# Patient Record
Sex: FEMALE | Race: WHITE | NOT HISPANIC OR LATINO | Employment: OTHER | URBAN - METROPOLITAN AREA
[De-identification: names, ages, dates, MRNs, and addresses within clinical notes are randomized per-mention and may not be internally consistent; named-entity substitution may affect disease eponyms.]

---

## 2022-02-15 ENCOUNTER — TRANSFERRED RECORDS (OUTPATIENT)
Dept: HEALTH INFORMATION MANAGEMENT | Facility: CLINIC | Age: 67
End: 2022-02-15
Payer: COMMERCIAL

## 2022-03-15 ENCOUNTER — OFFICE VISIT (OUTPATIENT)
Dept: VASCULAR SURGERY | Facility: CLINIC | Age: 67
End: 2022-03-15
Payer: COMMERCIAL

## 2022-03-15 DIAGNOSIS — I83.813 VARICOSE VEINS OF BILATERAL LOWER EXTREMITIES WITH PAIN: Primary | ICD-10-CM

## 2022-03-15 PROCEDURE — 99203 OFFICE O/P NEW LOW 30 MIN: CPT | Performed by: SURGERY

## 2022-03-15 RX ORDER — LACTOBACILLUS RHAMNOSUS GG 10B CELL
1 CAPSULE ORAL DAILY
COMMUNITY

## 2022-03-15 RX ORDER — ASCORBIC ACID 100 MG
TABLET,CHEWABLE ORAL
COMMUNITY

## 2022-03-15 RX ORDER — MULTIVIT WITH MINERALS/LUTEIN
1 TABLET ORAL DAILY
COMMUNITY

## 2022-03-15 NOTE — PROGRESS NOTES
Patient Reported symptoms:    Right leg   Heaviness None of the time   Achiness Some of the time   Swelling Some of the time   Throbbing None of the time   Itching Some of the time   Appearance Very noticeable   Impact on work/activities Mildly reduced     Left Leg   Heaviness None of the time   Achiness None of the time   Swelling Some of the time   Throbbing None of the time   Itching Some of the time   Appearance Very noticeable   Impact on work/activities Symptoms but full able to participate

## 2022-03-15 NOTE — LETTER
3/15/2022         RE: Nivia Christian  [address]  [facility] [address]        Dear Colleague,    Thank you for referring your patient, Nivia Christian, to the [facility] [place]. Please see a copy of my visit note below.        Patient Reported symptoms:    Right leg   Heaviness None of the time   Achiness Some of the time   Swelling Some of the time   Throbbing None of the time   Itching Some of the time   Appearance Very noticeable   Impact on work/activities Mildly reduced     Left Leg   Heaviness None of the time   Achiness None of the time   Swelling Some of the time   Throbbing None of the time   Itching Some of the time   Appearance Very noticeable   Impact on work/activities Symptoms but full able to participate    [facility] CONSULTATION    HPI:    Nivia Christian is a pleasant 67 year old self-referred female who presents with complaints of recurrent right lower extremity superficial thrombophlebitis.  The first episode of superficial thrombophlebitis occurred about 16 months ago.  The patient states that she was sitting in a recliner, got up to walk and noted significant discomfort in her right medial calf.  About 2 to 3 days prior to that she had been hiking with an ultrasound at that time showing superficial thrombophlebitis on the right medial calf.  On January 30, 2022, she noted some firmness of the right medial calf for which she contacted her primary care physician who felt that this was consistent with superficial thrombophlebitis.  There was no erythema, significant tenderness or swelling, therefore anti-inflammatories and antiplatelets were recommended.  On 2/15/2022 she developed firmness of the distal medial right thigh prompting a right lower extremity venous ultrasound which confirmed no evidence of deep vein thrombosis but extensive superficial phlebitis in the midportion of the right great saphenous vein.  The symptoms have slowly resolved.  Again, she has  "some mild firmness but this has nearly completely resolved.    She is currently asymptomatic.  She has no increased swelling of her right lower extremity and has had no pleuritic chest pain, shortness of breath or leg swelling.  She is somewhat concerned about recurrent superficial phlebitis and is seeking information.    He has no history of deep vein thrombosis or hemorrhage.  She has tried compression hose but does not seem to be able to tolerate them.  They were too tight on her calves if she wore knee-high compression and tended to roll down her thighs if she were thigh-high compression.    PAST MEDICAL HISTORY: Right distal medial leg \"stripping\" 1980s    PAST SURGICAL HISTORY:   Past Surgical History:   Procedure Laterality Date     ORTHOPEDIC SURGERY      Spinal fusion for scoliosis at age 19       FAMILY HISTORY: Varicose veins in her father who underwent vein stripping    SOCIAL HISTORY:   Social History     Tobacco Use     Smoking status: Never Smoker     Smokeless tobacco: Not on file   Substance Use Topics     Alcohol use: No       REVIEW OF SYSTEMS: Review Of Systems  Skin: negative  Eyes: negative  Ears/Nose/Throat: negative  Respiratory: No shortness of breath, dyspnea on exertion, cough, or hemoptysis  Cardiovascular: negative  Gastrointestinal: negative  Genitourinary: negative  Musculoskeletal: Back pain, neck pain, foot pain, leg swelling  Neurologic: negative  Psychiatric: negative  Hematologic/Lymphatic/Immunologic: negative  Endocrine: negative      Vital signs:  There were no vitals taken for this visit.    Current Outpatient Medications   Medication Sig Dispense Refill     Ascorbic Acid (VITAMIN C) 100 MG CHEW        lactobacillus rhamnosus, GG, (CULTURELL) capsule Take 1 capsule by mouth daily       multivitamin (CENTRUM SILVER) tablet Take 1 tablet by mouth daily       Omeprazole (PRILOSEC PO) Take  by mouth.       Vitamin D3 (CHOLECALCIFEROL) 125 MCG (5000 UT) tablet Take by mouth daily   "     docusate sodium 100 MG CAPS Take 100 mg by mouth 2 times daily (Patient not taking: Reported on 3/15/2022) 60 capsule 0     oxyCODONE-acetaminophen (PERCOCET) 5-325 MG per tablet Take 1-2 tablets by mouth every 6 hours as needed for pain (Patient not taking: Reported on 3/15/2022) 45 tablet 0     SERTRALINE HCL PO Take 50 mg by mouth daily. (Patient not taking: Reported on 3/15/2022)         PHYSICAL EXAM:  General: Pleasant, NAD.   HEENT: Normocephalic, atraumatic, external ears and nose normal.   Respiratory: Normal respiratory effort.   Cardiovascular: Pulse is regular.   Musculoskeletal: Gait and station normal.  The joints of her fingers and toes without deformity.  There is no cyanosis of her nailbeds.   EXTREMITIES: Right lower extremity: Few scattered telangiectasias.  No significant varicose veins.  Small area of induration proximal medial right calf and area of previous superficial thrombophlebitis.  No erythema or significant swelling.  No stasis changes.    Left lower extremity: 5 to 7 mm varicosities over the distal medial left thigh and on the medial left calf.  Trace edema.  No stasis hyperpigmentation..    PULSES: R/L (3=normal pulse, 0=no palpable pulse) dorsalis pedis: 2/2; posterior tibial: 2/2.      Neurologic: Grossly normal  Psychiatric: Mood, affect, judgment and insight are normal     Venous Duplex Ultrasound: 2/15/2022 [facility]  No evidence of deep vein thrombosis.  Extensive superficial phlebitis of the midportion of the right great saphenous vein    ASSESSMENT:  Recurrent superficial thrombophlebitis right lower extremity.  significant tenderness.  This seems to be resolving nicely and the patient is asymptomatic at this time.  We discussed the lower extremity vein anatomy, the pathophysiology of venous insufficiency and the 30 to 50% chance of recurrent superficial thrombophlebitis.  The patient states that she had varicosities visible on the right leg prior to the phlebitis and  these are no longer visible.    She has asymptomatic varicose veins of her left lower extremity without any complications.  The option of continued conservative measures with risks of superficial phlebitis, bleeding and progression of the disease process were discussed.  The patient prefers conservative measures as possible as she is currently asymptomatic.    We discussed options for treating superficial venous insufficiency in an effort to decrease risks of superficial thrombophlebitis.  Endovenous ablation for axial incompetence with risks of superficial thrombophlebitis, deep vein thrombosis, bleeding, infection, nerve injury were all discussed.  She understands this will be performed in the office setting.    PLAN:  Conservative management.  The patient's had difficulty with compression, therefore we will see if we can fit her with appropriate compression which should be worn on a regular basis, especially when less active or when traveling.  She will contact us if she has other concerns or questions     David Key MD    Dictated using Dragon voice recognition software which may result in transcription errors    [facility] LEG DRAWING:                  Again, thank you for allowing me to participate in the care of your patient.        Sincerely,        David Key MD

## 2022-03-16 NOTE — PROGRESS NOTES
"[facility] CONSULTATION    HPI:    Nivia Christian is a pleasant 67 year old self-referred female who presents with complaints of recurrent right lower extremity superficial thrombophlebitis.  The first episode of superficial thrombophlebitis occurred about 16 months ago.  The patient states that she was sitting in a recliner, got up to walk and noted significant discomfort in her right medial calf.  About 2 to 3 days prior to that she had been hiking with an ultrasound at that time showing superficial thrombophlebitis on the right medial calf.  On January 30, 2022, she noted some firmness of the right medial calf for which she contacted her primary care physician who felt that this was consistent with superficial thrombophlebitis.  There was no erythema, significant tenderness or swelling, therefore anti-inflammatories and antiplatelets were recommended.  On 2/15/2022 she developed firmness of the distal medial right thigh prompting a right lower extremity venous ultrasound which confirmed no evidence of deep vein thrombosis but extensive superficial phlebitis in the midportion of the right great saphenous vein.  The symptoms have slowly resolved.  Again, she has some mild firmness but this has nearly completely resolved.    She is currently asymptomatic.  She has no increased swelling of her right lower extremity and has had no pleuritic chest pain, shortness of breath or leg swelling.  She is somewhat concerned about recurrent superficial phlebitis and is seeking information.    He has no history of deep vein thrombosis or hemorrhage.  She has tried compression hose but does not seem to be able to tolerate them.  They were too tight on her calves if she wore knee-high compression and tended to roll down her thighs if she were thigh-high compression.    PAST MEDICAL HISTORY: Right distal medial leg \"stripping\" 1980s    PAST SURGICAL HISTORY:   Past Surgical History:   Procedure Laterality Date     ORTHOPEDIC " SURGERY      Spinal fusion for scoliosis at age 19       FAMILY HISTORY: Varicose veins in her father who underwent vein stripping    SOCIAL HISTORY:   Social History     Tobacco Use     Smoking status: Never Smoker     Smokeless tobacco: Not on file   Substance Use Topics     Alcohol use: No       REVIEW OF SYSTEMS: Review Of Systems  Skin: negative  Eyes: negative  Ears/Nose/Throat: negative  Respiratory: No shortness of breath, dyspnea on exertion, cough, or hemoptysis  Cardiovascular: negative  Gastrointestinal: negative  Genitourinary: negative  Musculoskeletal: Back pain, neck pain, foot pain, leg swelling  Neurologic: negative  Psychiatric: negative  Hematologic/Lymphatic/Immunologic: negative  Endocrine: negative      Vital signs:  There were no vitals taken for this visit.    Current Outpatient Medications   Medication Sig Dispense Refill     Ascorbic Acid (VITAMIN C) 100 MG CHEW        lactobacillus rhamnosus, GG, (CULTURELL) capsule Take 1 capsule by mouth daily       multivitamin (CENTRUM SILVER) tablet Take 1 tablet by mouth daily       Omeprazole (PRILOSEC PO) Take  by mouth.       Vitamin D3 (CHOLECALCIFEROL) 125 MCG (5000 UT) tablet Take by mouth daily       docusate sodium 100 MG CAPS Take 100 mg by mouth 2 times daily (Patient not taking: Reported on 3/15/2022) 60 capsule 0     oxyCODONE-acetaminophen (PERCOCET) 5-325 MG per tablet Take 1-2 tablets by mouth every 6 hours as needed for pain (Patient not taking: Reported on 3/15/2022) 45 tablet 0     SERTRALINE HCL PO Take 50 mg by mouth daily. (Patient not taking: Reported on 3/15/2022)         PHYSICAL EXAM:  General: Pleasant, NAD.   HEENT: Normocephalic, atraumatic, external ears and nose normal.   Respiratory: Normal respiratory effort.   Cardiovascular: Pulse is regular.   Musculoskeletal: Gait and station normal.  The joints of her fingers and toes without deformity.  There is no cyanosis of her nailbeds.   EXTREMITIES: Right lower extremity:  Few scattered telangiectasias.  No significant varicose veins.  Small area of induration proximal medial right calf and area of previous superficial thrombophlebitis.  No erythema or significant swelling.  No stasis changes.    Left lower extremity: 5 to 7 mm varicosities over the distal medial left thigh and on the medial left calf.  Trace edema.  No stasis hyperpigmentation..    PULSES: R/L (3=normal pulse, 0=no palpable pulse) dorsalis pedis: 2/2; posterior tibial: 2/2.      Neurologic: Grossly normal  Psychiatric: Mood, affect, judgment and insight are normal     Venous Duplex Ultrasound: 2/15/2022 [facility]  No evidence of deep vein thrombosis.  Extensive superficial phlebitis of the midportion of the right great saphenous vein    ASSESSMENT:  Recurrent superficial thrombophlebitis right lower extremity.  significant tenderness.  This seems to be resolving nicely and the patient is asymptomatic at this time.  We discussed the lower extremity vein anatomy, the pathophysiology of venous insufficiency and the 30 to 50% chance of recurrent superficial thrombophlebitis.  The patient states that she had varicosities visible on the right leg prior to the phlebitis and these are no longer visible.    She has asymptomatic varicose veins of her left lower extremity without any complications.  The option of continued conservative measures with risks of superficial phlebitis, bleeding and progression of the disease process were discussed.  The patient prefers conservative measures as possible as she is currently asymptomatic.    We discussed options for treating superficial venous insufficiency in an effort to decrease risks of superficial thrombophlebitis.  Endovenous ablation for axial incompetence with risks of superficial thrombophlebitis, deep vein thrombosis, bleeding, infection, nerve injury were all discussed.  She understands this will be performed in the office setting.    PLAN:  Conservative management.  The  patient's had difficulty with compression, therefore we will see if we can fit her with appropriate compression which should be worn on a regular basis, especially when less active or when traveling.  She will contact us if she has other concerns or questions     David Key MD    Dictated using Dragon voice recognition software which may result in transcription errors    [facility] LEG DRAWING:

## 2024-04-27 ENCOUNTER — OFFICE VISIT (OUTPATIENT)
Dept: URGENT CARE | Facility: URGENT CARE | Age: 69
End: 2024-04-27
Payer: COMMERCIAL

## 2024-04-27 VITALS
OXYGEN SATURATION: 97 % | TEMPERATURE: 98.4 F | HEART RATE: 66 BPM | RESPIRATION RATE: 18 BRPM | SYSTOLIC BLOOD PRESSURE: 151 MMHG | WEIGHT: 182 LBS | DIASTOLIC BLOOD PRESSURE: 91 MMHG | BODY MASS INDEX: 27.68 KG/M2

## 2024-04-27 DIAGNOSIS — J01.90 ACUTE SINUSITIS WITH COEXISTING CONDITION REQUIRING PROPHYLACTIC TREATMENT: Primary | ICD-10-CM

## 2024-04-27 DIAGNOSIS — R05.8 PRODUCTIVE COUGH: ICD-10-CM

## 2024-04-27 PROCEDURE — 99203 OFFICE O/P NEW LOW 30 MIN: CPT | Performed by: FAMILY MEDICINE

## 2024-04-27 RX ORDER — CEFDINIR 300 MG/1
300 CAPSULE ORAL 2 TIMES DAILY
Qty: 14 CAPSULE | Refills: 0 | Status: SHIPPED | OUTPATIENT
Start: 2024-04-27 | End: 2024-05-04

## 2024-04-27 RX ORDER — BENZONATATE 200 MG/1
200 CAPSULE ORAL 3 TIMES DAILY PRN
Qty: 21 CAPSULE | Refills: 0 | Status: SHIPPED | OUTPATIENT
Start: 2024-04-27 | End: 2024-05-04

## 2024-04-27 RX ORDER — ALENDRONATE SODIUM 70 MG/1
70 TABLET ORAL
COMMUNITY
Start: 2024-02-18

## 2024-04-28 ENCOUNTER — NURSE TRIAGE (OUTPATIENT)
Dept: NURSING | Facility: CLINIC | Age: 69
End: 2024-04-28
Payer: COMMERCIAL

## 2024-04-28 NOTE — PROGRESS NOTES
SUBJECTIVE: Nivia Christian is a 69 year old female presenting with a chief complaint of nasal congestion, cough , and facial pain/pressure.  Onset of symptoms was 9 day(s) ago.  Course of illness is worsening.    Predisposing factors include None.    History reviewed. No pertinent past medical history.  Allergies   Allergen Reactions    Doxycycline Diarrhea    Azithromycin     Codeine Sulfate Nausea and Vomiting    Erythromycin     Levofloxacin Hives    Shellfish Allergy Hives    Penicillins Rash     Social History     Tobacco Use    Smoking status: Never    Smokeless tobacco: Not on file   Substance Use Topics    Alcohol use: No       ROS:  SKIN: no rash  GI: no vomiting    OBJECTIVE:  BP (!) 151/91 (BP Location: Left arm, Patient Position: Sitting, Cuff Size: Adult Regular)   Pulse 66   Temp 98.4  F (36.9  C) (Oral)   Resp 18   Wt 82.6 kg (182 lb)   SpO2 97%   BMI 27.68 kg/m  GENERAL APPEARANCE: healthy, alert and no distress  EYES: EOMI,  PERRL, conjunctiva clear  HENT: TM's normal bilaterally, rhinorrhea clear, oral mucous membranes moist, no erythema noted, and maxillary sinus tenderness   RESP: lungs clear to auscultation - no rales, rhonchi or wheezes  SKIN: no suspicious lesions or rashes      ICD-10-CM    1. Acute sinusitis with coexisting condition requiring prophylactic treatment  J01.90 cefdinir (OMNICEF) 300 MG capsule      2. Productive cough  R05.8 benzonatate (TESSALON) 200 MG capsule          Fluids/Rest, f/u if worse/not any better

## 2024-04-28 NOTE — TELEPHONE ENCOUNTER
Nurse Triage SBAR    Situation:   -mouth bump    Background:   -Patient calling  -It is okay to call back and leave a detailed message at this number:  [phone]     Assessment:   -seen in  last night for bronchitis/sinusistis  -was put on benzonatate   -one hour ago, felt back molar by gum line  -felt puffy, then felt it pop  -now looks like a little dent  -no bleeding  -no difficulty breathing  -no chest pain  -no fever    Recommendation: See PCP Within 2 Weeks   -Call back with and questions, concerns, or any change in symptoms    NGA WEATHERS RN 4/28/2024 3:59 PM     Reason for Disposition   All other mouth symptoms (Exceptions: dry mouth from not drinking enough liquids, chapped lips)    Additional Information   Negative: SEVERE difficulty breathing (e.g., struggling for each breath, speaks in single words, stridor)   Negative: Sounds like a life-threatening emergency to the triager   Negative: [1] Drooling or spitting out saliva (because can't swallow) AND [2] new-onset   Negative: [1] Bleeding from mouth AND [2] won't stop after 10 minutes of direct pressure   Negative: [1] Difficulty breathing AND [2] not severe   Negative: Patient sounds very sick or weak to the triager   Negative: [1] Bloody crusts on lips or sores in mouth AND [2] rash anywhere elese on body (back, chest, face, palms, soles)   Negative: [1] Gum bleeding AND [2] taking Coumadin (warfarin) or other strong blood thinner, or known bleeding disorder (e.g., thrombocytopenia)   Negative: [1] Dry mouth AND [2] urinating more frequently than usual (i.e., frequency)   Negative: [1] Dry mouth AND [2] drinking more liquids than usual (thirsty) AND [3] > 1 day (24 hours)   Negative: Receiving chemotherapy or radiation therapy   Negative: [1] White patches that stick to tongue or inner cheek AND [2] can be wiped off   Negative: [1] Dry mouth AND [2] new-onset AND [3] unexplained (Exceptions: chronic symptom or dry mouth from mild  dehydration)   Negative: Weak immune system (e.g., HIV positive, cancer chemo, splenectomy, organ transplant, chronic steroids)   Negative: Dentures rub and cause pain   Negative: Painless lump in mouth   Negative: Red patch in mouth or on tongue   Negative: White patch in mouth or on tongue   Negative: Dry mouth is a chronic symptom (recurrent or ongoing AND present > 4 weeks)   Negative: Bleeding from mouth is a chronic symptom (recurrent or ongoing AND present > 4 weeks)   Negative: Cracked skin at corners of mouth (i.e., where lips come together)   Negative: [1] Chapped lips AND [2] no improvement using Care Advice    Protocols used: Mouth Symptoms-A-AH

## 2024-07-01 ENCOUNTER — HOSPITAL ENCOUNTER (EMERGENCY)
Facility: CLINIC | Age: 69
Discharge: HOME | End: 2024-07-01
Attending: EMERGENCY MEDICINE | Admitting: EMERGENCY MEDICINE
Payer: COMMERCIAL

## 2024-07-01 ENCOUNTER — APPOINTMENT (OUTPATIENT)
Dept: CT IMAGING | Facility: CLINIC | Age: 69
End: 2024-07-01
Attending: EMERGENCY MEDICINE
Payer: COMMERCIAL

## 2024-07-01 VITALS
DIASTOLIC BLOOD PRESSURE: 83 MMHG | WEIGHT: 182 LBS | HEIGHT: 68 IN | HEART RATE: 18 BPM | OXYGEN SATURATION: 98 % | TEMPERATURE: 98.1 F | SYSTOLIC BLOOD PRESSURE: 152 MMHG | BODY MASS INDEX: 27.58 KG/M2 | RESPIRATION RATE: 16 BRPM

## 2024-07-01 DIAGNOSIS — R42 DIZZINESS: ICD-10-CM

## 2024-07-01 LAB
ALBUMIN SERPL BCG-MCNC: 4.2 G/DL (ref 3.5–5.2)
ALBUMIN UR-MCNC: NEGATIVE MG/DL
ALP SERPL-CCNC: 70 U/L (ref 40–150)
ALT SERPL W P-5'-P-CCNC: 17 U/L (ref 0–50)
ANION GAP SERPL CALCULATED.3IONS-SCNC: 11 MMOL/L (ref 7–15)
APPEARANCE UR: CLEAR
AST SERPL W P-5'-P-CCNC: 25 U/L (ref 0–45)
ATRIAL RATE - MUSE: 74 BPM
BACTERIA #/AREA URNS HPF: ABNORMAL /HPF
BASOPHILS # BLD AUTO: 0 10E3/UL (ref 0–0.2)
BASOPHILS NFR BLD AUTO: 0 %
BILIRUB SERPL-MCNC: 1.9 MG/DL
BILIRUB UR QL STRIP: NEGATIVE
BUN SERPL-MCNC: 9.7 MG/DL (ref 8–23)
CALCIUM SERPL-MCNC: 8.9 MG/DL (ref 8.8–10.2)
CHLORIDE SERPL-SCNC: 103 MMOL/L (ref 98–107)
COLOR UR AUTO: ABNORMAL
CREAT SERPL-MCNC: 0.73 MG/DL (ref 0.51–0.95)
DEPRECATED HCO3 PLAS-SCNC: 24 MMOL/L (ref 22–29)
DIASTOLIC BLOOD PRESSURE - MUSE: NORMAL MMHG
EGFRCR SERPLBLD CKD-EPI 2021: 89 ML/MIN/1.73M2
EOSINOPHIL # BLD AUTO: 0 10E3/UL (ref 0–0.7)
EOSINOPHIL NFR BLD AUTO: 0 %
ERYTHROCYTE [DISTWIDTH] IN BLOOD BY AUTOMATED COUNT: 11.9 % (ref 10–15)
FLUAV RNA SPEC QL NAA+PROBE: NEGATIVE
FLUBV RNA RESP QL NAA+PROBE: NEGATIVE
GLUCOSE SERPL-MCNC: 109 MG/DL (ref 70–99)
GLUCOSE UR STRIP-MCNC: NEGATIVE MG/DL
HCT VFR BLD AUTO: 43 % (ref 35–47)
HGB BLD-MCNC: 14.7 G/DL (ref 11.7–15.7)
HGB UR QL STRIP: NEGATIVE
HOLD SPECIMEN: NORMAL
IMM GRANULOCYTES # BLD: 0 10E3/UL
IMM GRANULOCYTES NFR BLD: 0 %
INTERPRETATION ECG - MUSE: NORMAL
KETONES UR STRIP-MCNC: 10 MG/DL
LEUKOCYTE ESTERASE UR QL STRIP: NEGATIVE
LYMPHOCYTES # BLD AUTO: 1.4 10E3/UL (ref 0.8–5.3)
LYMPHOCYTES NFR BLD AUTO: 14 %
MAGNESIUM SERPL-MCNC: 2.3 MG/DL (ref 1.7–2.3)
MCH RBC QN AUTO: 30.8 PG (ref 26.5–33)
MCHC RBC AUTO-ENTMCNC: 34.2 G/DL (ref 31.5–36.5)
MCV RBC AUTO: 90 FL (ref 78–100)
MONOCYTES # BLD AUTO: 0.4 10E3/UL (ref 0–1.3)
MONOCYTES NFR BLD AUTO: 5 %
NEUTROPHILS # BLD AUTO: 7.9 10E3/UL (ref 1.6–8.3)
NEUTROPHILS NFR BLD AUTO: 81 %
NITRATE UR QL: NEGATIVE
NRBC # BLD AUTO: 0 10E3/UL
NRBC BLD AUTO-RTO: 0 /100
NT-PROBNP SERPL-MCNC: 118 PG/ML (ref 0–900)
P AXIS - MUSE: 57 DEGREES
PH UR STRIP: 7 [PH] (ref 5–7)
PLATELET # BLD AUTO: 261 10E3/UL (ref 150–450)
POTASSIUM SERPL-SCNC: 4.3 MMOL/L (ref 3.4–5.3)
PR INTERVAL - MUSE: 174 MS
PROT SERPL-MCNC: 7.1 G/DL (ref 6.4–8.3)
QRS DURATION - MUSE: 84 MS
QT - MUSE: 424 MS
QTC - MUSE: 470 MS
R AXIS - MUSE: -10 DEGREES
RBC # BLD AUTO: 4.77 10E6/UL (ref 3.8–5.2)
RBC URINE: 0 /HPF
RSV RNA SPEC NAA+PROBE: NEGATIVE
SARS-COV-2 RNA RESP QL NAA+PROBE: NEGATIVE
SODIUM SERPL-SCNC: 138 MMOL/L (ref 135–145)
SP GR UR STRIP: 1.01 (ref 1–1.03)
SQUAMOUS EPITHELIAL: 2 /HPF
SYSTOLIC BLOOD PRESSURE - MUSE: NORMAL MMHG
T AXIS - MUSE: 1 DEGREES
TROPONIN T SERPL HS-MCNC: 13 NG/L
UROBILINOGEN UR STRIP-MCNC: NORMAL MG/DL
VENTRICULAR RATE- MUSE: 74 BPM
WBC # BLD AUTO: 9.8 10E3/UL (ref 4–11)
WBC URINE: <1 /HPF

## 2024-07-01 PROCEDURE — 85025 COMPLETE CBC W/AUTO DIFF WBC: CPT | Performed by: EMERGENCY MEDICINE

## 2024-07-01 PROCEDURE — 258N000003 HC RX IP 258 OP 636: Performed by: EMERGENCY MEDICINE

## 2024-07-01 PROCEDURE — 96361 HYDRATE IV INFUSION ADD-ON: CPT

## 2024-07-01 PROCEDURE — 36415 COLL VENOUS BLD VENIPUNCTURE: CPT | Performed by: EMERGENCY MEDICINE

## 2024-07-01 PROCEDURE — 84460 ALANINE AMINO (ALT) (SGPT): CPT | Performed by: EMERGENCY MEDICINE

## 2024-07-01 PROCEDURE — 93005 ELECTROCARDIOGRAM TRACING: CPT

## 2024-07-01 PROCEDURE — 250N000013 HC RX MED GY IP 250 OP 250 PS 637: Performed by: EMERGENCY MEDICINE

## 2024-07-01 PROCEDURE — 250N000011 HC RX IP 250 OP 636: Performed by: EMERGENCY MEDICINE

## 2024-07-01 PROCEDURE — 87637 SARSCOV2&INF A&B&RSV AMP PRB: CPT | Performed by: EMERGENCY MEDICINE

## 2024-07-01 PROCEDURE — 83880 ASSAY OF NATRIURETIC PEPTIDE: CPT | Performed by: EMERGENCY MEDICINE

## 2024-07-01 PROCEDURE — 99285 EMERGENCY DEPT VISIT HI MDM: CPT | Mod: 25

## 2024-07-01 PROCEDURE — 84484 ASSAY OF TROPONIN QUANT: CPT | Performed by: EMERGENCY MEDICINE

## 2024-07-01 PROCEDURE — 83735 ASSAY OF MAGNESIUM: CPT | Performed by: EMERGENCY MEDICINE

## 2024-07-01 PROCEDURE — 70450 CT HEAD/BRAIN W/O DYE: CPT

## 2024-07-01 PROCEDURE — 81001 URINALYSIS AUTO W/SCOPE: CPT | Performed by: EMERGENCY MEDICINE

## 2024-07-01 PROCEDURE — 96374 THER/PROPH/DIAG INJ IV PUSH: CPT

## 2024-07-01 RX ORDER — ONDANSETRON 4 MG/1
4 TABLET, ORALLY DISINTEGRATING ORAL EVERY 8 HOURS PRN
Qty: 20 TABLET | Refills: 0 | Status: SHIPPED | OUTPATIENT
Start: 2024-07-01

## 2024-07-01 RX ORDER — MECLIZINE HYDROCHLORIDE 25 MG/1
25 TABLET ORAL ONCE
Status: COMPLETED | OUTPATIENT
Start: 2024-07-01 | End: 2024-07-01

## 2024-07-01 RX ORDER — METOCLOPRAMIDE HYDROCHLORIDE 5 MG/ML
10 INJECTION INTRAMUSCULAR; INTRAVENOUS ONCE
Status: COMPLETED | OUTPATIENT
Start: 2024-07-01 | End: 2024-07-01

## 2024-07-01 RX ORDER — ACETAMINOPHEN 325 MG/1
975 TABLET ORAL ONCE
Status: COMPLETED | OUTPATIENT
Start: 2024-07-01 | End: 2024-07-01

## 2024-07-01 RX ORDER — MECLIZINE HYDROCHLORIDE 25 MG/1
25 TABLET ORAL 3 TIMES DAILY PRN
Qty: 20 TABLET | Refills: 0 | Status: SHIPPED | OUTPATIENT
Start: 2024-07-01

## 2024-07-01 RX ADMIN — MECLIZINE HYDROCHLORIDE 25 MG: 25 TABLET ORAL at 14:22

## 2024-07-01 RX ADMIN — SODIUM CHLORIDE 1000 ML: 9 INJECTION, SOLUTION INTRAVENOUS at 14:09

## 2024-07-01 RX ADMIN — METOCLOPRAMIDE 10 MG: 5 INJECTION, SOLUTION INTRAMUSCULAR; INTRAVENOUS at 14:22

## 2024-07-01 RX ADMIN — ACETAMINOPHEN 975 MG: 325 TABLET, FILM COATED ORAL at 14:22

## 2024-07-01 ASSESSMENT — COLUMBIA-SUICIDE SEVERITY RATING SCALE - C-SSRS
2. HAVE YOU ACTUALLY HAD ANY THOUGHTS OF KILLING YOURSELF IN THE PAST MONTH?: NO
6. HAVE YOU EVER DONE ANYTHING, STARTED TO DO ANYTHING, OR PREPARED TO DO ANYTHING TO END YOUR LIFE?: NO
1. IN THE PAST MONTH, HAVE YOU WISHED YOU WERE DEAD OR WISHED YOU COULD GO TO SLEEP AND NOT WAKE UP?: NO

## 2024-07-01 ASSESSMENT — ACTIVITIES OF DAILY LIVING (ADL)
ADLS_ACUITY_SCORE: 35

## 2024-07-01 NOTE — ED TRIAGE NOTES
Patient has been having dizziness for the last 24 hours. Patient gets nausea and vomiting.      Triage Assessment (Adult)       Row Name 07/01/24 1227          Triage Assessment    Airway WDL WDL        Respiratory WDL    Respiratory WDL WDL        Skin Circulation/Temperature WDL    Skin Circulation/Temperature WDL WDL        Cardiac WDL    Cardiac WDL WDL        Peripheral/Neurovascular WDL    Peripheral Neurovascular WDL X  dizziness        Cognitive/Neuro/Behavioral WDL    Cognitive/Neuro/Behavioral WDL WDL

## 2024-07-01 NOTE — ED PROVIDER NOTES
"  Emergency Department Note      History of Present Illness     Chief Complaint   Dizziness      HPI   Nivia Christian is a 69 year old female with history of osteoporosis and spinal fusion who presents for evaluation of dizziness. The patient states yesterday morning around 0830, she woke up with dizziness with associated nausea. She states that her dizziness has been constant since onset. She reports that it improved yesterday as she was going to bed but it returned this morning after waking up. She adds that she has been dry heaving and not eating or drinking a lot. She treated her dizziness by taking a tylenol followed by an ibuprofen yesterday. Denies unsteady gait, chest pain, and shortness of breath.  She also notes experiencing two episodes of mild diarrhea yesterday. Denies abdominal pain or constipation.  She does report some headache.  Denies general numbness. Denies cough, fever, and congestion. Denies personal or family history of heart problems.    Independent Historian   None    Review of External Notes   Telephone note from today reviewed when the patient called for possible vertigo.  Noted that her symptoms had not improved since onset yesterday.    Past Medical History     Medical History and Problem List   Osteoporosis  Anxiety  Diverticulosis of colon  Polyp of colon  Dyslipidemia  Ovarian cyst  Scoliosis     Medications   Fosamax    Surgical History   Spinal fusion  Basal cell carcinoma excision  Colonoscopy   Hysterectomy     Physical Exam     Patient Vitals for the past 24 hrs:   BP Temp Temp src Pulse Resp SpO2 Height Weight   07/01/24 1828 -- -- -- (!) 18 -- -- -- --   07/01/24 1730 (!) 152/83 -- -- 59 -- -- -- --   07/01/24 1633 135/82 -- -- 58 -- -- -- --   07/01/24 1225 (!) 165/86 98.1  F (36.7  C) Oral 62 16 98 % 1.715 m (5' 7.5\") 82.6 kg (182 lb)     Physical Exam  General: Laying on the ED bed  HEENT: Normocephalic, atraumatic  Cardiac: Radial pulses 2+, regular rate and rhythm  Pulm: " Breathing comfortably, no accessory muscle usage, no conversational dyspnea, and lungs clear bilaterally  GI: Abdomen soft, nontender, no rigidity or guarding  MSK: No bony deformities  Skin: Warm and dry  Neuro: CN II through XII intact, sensorimotor intact distal extremities x 4  Psych: Pleasant mood and affect    Diagnostics     Lab Results   Labs Ordered and Resulted from Time of ED Arrival to Time of ED Departure   COMPREHENSIVE METABOLIC PANEL - Abnormal       Result Value    Sodium 138      Potassium 4.3      Carbon Dioxide (CO2) 24      Anion Gap 11      Urea Nitrogen 9.7      Creatinine 0.73      GFR Estimate 89      Calcium 8.9      Chloride 103      Glucose 109 (*)     Alkaline Phosphatase 70      AST 25      ALT 17      Protein Total 7.1      Albumin 4.2      Bilirubin Total 1.9 (*)    ROUTINE UA WITH MICROSCOPIC REFLEX TO CULTURE - Abnormal    Color Urine Light Yellow      Appearance Urine Clear      Glucose Urine Negative      Bilirubin Urine Negative      Ketones Urine 10 (*)     Specific Gravity Urine 1.007      Blood Urine Negative      pH Urine 7.0      Protein Albumin Urine Negative      Urobilinogen Urine Normal      Nitrite Urine Negative      Leukocyte Esterase Urine Negative      Bacteria Urine Few (*)     RBC Urine 0      WBC Urine <1      Squamous Epithelials Urine 2 (*)    TROPONIN T, HIGH SENSITIVITY - Normal    Troponin T, High Sensitivity 13     MAGNESIUM - Normal    Magnesium 2.3     NT PROBNP INPATIENT - Normal    N terminal Pro BNP Inpatient 118     INFLUENZA A/B, RSV, & SARS-COV2 PCR - Normal    Influenza A PCR Negative      Influenza B PCR Negative      RSV PCR Negative      SARS CoV2 PCR Negative     CBC WITH PLATELETS AND DIFFERENTIAL    WBC Count 9.8      RBC Count 4.77      Hemoglobin 14.7      Hematocrit 43.0      MCV 90      MCH 30.8      MCHC 34.2      RDW 11.9      Platelet Count 261      % Neutrophils 81      % Lymphocytes 14      % Monocytes 5      % Eosinophils 0      %  Basophils 0      % Immature Granulocytes 0      NRBCs per 100 WBC 0      Absolute Neutrophils 7.9      Absolute Lymphocytes 1.4      Absolute Monocytes 0.4      Absolute Eosinophils 0.0      Absolute Basophils 0.0      Absolute Immature Granulocytes 0.0      Absolute NRBCs 0.0         Imaging   CT Head w/o Contrast   Final Result   IMPRESSION:   No evidence of acute intracranial hemorrhage, mass, or   herniation.         FEI MELÉNDEZ MD            SYSTEM ID:  [id number]          EKG   ECG taken at 1448, ECG read at 1815  Sinus rhythm with premature ventricular complexes or fusion    No significant change as compared to prior, dated 5/12/14.  Rate 74 bpm. GA interval 174 ms. QRS duration 84 ms. QT/QTc 424/470 ms. P-R-T axes 57 -10 1.    Independent Interpretation   CT Head: No intracranial hemorrhage.    ED Course      Medications Administered   Medications   sodium chloride 0.9% BOLUS 1,000 mL (0 mLs Intravenous Stopped 7/1/24 1646)   meclizine (ANTIVERT) tablet 25 mg (25 mg Oral $Given 7/1/24 1422)   acetaminophen (TYLENOL) tablet 975 mg (975 mg Oral $Given 7/1/24 1422)   metoclopramide (REGLAN) injection 10 mg (10 mg Intravenous $Given 7/1/24 1422)       Procedures   Procedures     Discussion of Management   None    ED Course   ED Course as of 07/01/24 1910   Mon Jul 01, 2024   1355 I obtained history and performed physical exam as noted above.    1807 I reassessed the patient and prepared her for discharge.       Optional/Additional Documentation  None    Medical Decision Making / Diagnosis     CMS Diagnoses: None    MIPS       None    MDM   Nivia Christian is a 69-year-old female presents with dizziness as above.  Symptoms do not seem entirely consistent with vertigo, also not feeling lightheaded.  She does describe some dry heaving and a viral syndrome is 1 possible etiology of her overall presentation.  She is otherwise neurologically intact on examination.  Lab work is overall quite reassuring.  Also  considering the possibility of an atypical cardiac etiology.  EKG is nonischemic and troponin is negative, making that seem much less likely.  Head CT shows no evidence of ICH.  The patient was treated with a bolus of fluids, Reglan, Tylenol, and meclizine.  Upon reevaluation, the patient states her symptoms are improved.  Overall picture seems consistent with a likely viral syndrome.  Given the improvement of her symptoms in the ED, I do not think that further workup or treatment here is needed.  Plan is supportive care at home with copious p.o. hydration and Zofran and meclizine as needed.  Recommend the patient follow-up with her primary care provider next week for further care, RTED for persistent or worsening symptoms or other emergent concerns over the holiday.    Disposition   The patient was discharged.     Diagnosis     ICD-10-CM    1. Dizziness  R42            Discharge Medications   Discharge Medication List as of 7/1/2024  6:21 PM        START taking these medications    Details   meclizine (ANTIVERT) 25 MG tablet Take 1 tablet (25 mg) by mouth 3 times daily as needed for dizziness, Disp-20 tablet, R-0, E-Prescribe      ondansetron (ZOFRAN ODT) 4 MG ODT tab Take 1 tablet (4 mg) by mouth every 8 hours as needed for nausea, Disp-20 tablet, R-0, E-Prescribe               Scribe Disclosure:  Esperanza SARABIA, am serving as a scribe at 2:01 PM on 7/1/2024 to document services personally performed by Jose De Jesus Maynard MD based on my observations and the provider's statements to me.        Jose De Jesus Maynard MD  07/01/24 1911